# Patient Record
Sex: FEMALE
[De-identification: names, ages, dates, MRNs, and addresses within clinical notes are randomized per-mention and may not be internally consistent; named-entity substitution may affect disease eponyms.]

---

## 2019-11-11 ENCOUNTER — RESULT REVIEW (OUTPATIENT)
Age: 29
End: 2019-11-11

## 2019-11-13 PROBLEM — Z00.00 ENCOUNTER FOR PREVENTIVE HEALTH EXAMINATION: Status: ACTIVE | Noted: 2019-11-13

## 2019-11-21 ENCOUNTER — APPOINTMENT (OUTPATIENT)
Dept: OBGYN | Facility: CLINIC | Age: 29
End: 2019-11-21

## 2021-02-02 ENCOUNTER — TRANSCRIPTION ENCOUNTER (OUTPATIENT)
Age: 31
End: 2021-02-02

## 2021-02-27 ENCOUNTER — TRANSCRIPTION ENCOUNTER (OUTPATIENT)
Age: 31
End: 2021-02-27

## 2021-03-27 ENCOUNTER — TRANSCRIPTION ENCOUNTER (OUTPATIENT)
Age: 31
End: 2021-03-27

## 2021-04-22 ENCOUNTER — TRANSCRIPTION ENCOUNTER (OUTPATIENT)
Age: 31
End: 2021-04-22

## 2024-09-04 ENCOUNTER — NON-APPOINTMENT (OUTPATIENT)
Age: 34
End: 2024-09-04

## 2024-09-04 ENCOUNTER — APPOINTMENT (OUTPATIENT)
Dept: ORTHOPEDIC SURGERY | Facility: CLINIC | Age: 34
End: 2024-09-04
Payer: COMMERCIAL

## 2024-09-04 VITALS
DIASTOLIC BLOOD PRESSURE: 81 MMHG | OXYGEN SATURATION: 99 % | HEART RATE: 76 BPM | SYSTOLIC BLOOD PRESSURE: 114 MMHG | HEIGHT: 65 IN | WEIGHT: 130 LBS | BODY MASS INDEX: 21.66 KG/M2

## 2024-09-04 DIAGNOSIS — M54.2 CERVICALGIA: ICD-10-CM

## 2024-09-04 DIAGNOSIS — Z78.9 OTHER SPECIFIED HEALTH STATUS: ICD-10-CM

## 2024-09-04 DIAGNOSIS — R20.0 ANESTHESIA OF SKIN: ICD-10-CM

## 2024-09-04 DIAGNOSIS — Z60.2 PROBLEMS RELATED TO LIVING ALONE: ICD-10-CM

## 2024-09-04 DIAGNOSIS — G89.29 CERVICALGIA: ICD-10-CM

## 2024-09-04 DIAGNOSIS — R20.2 ANESTHESIA OF SKIN: ICD-10-CM

## 2024-09-04 PROCEDURE — 99204 OFFICE O/P NEW MOD 45 MIN: CPT

## 2024-09-04 RX ORDER — LEVONORGESTREL AND ETHINYL ESTRADIOL 0.1-0.02MG
KIT ORAL
Refills: 0 | Status: ACTIVE | COMMUNITY

## 2024-09-04 RX ORDER — METHYLPREDNISOLONE 4 MG/1
4 TABLET ORAL
Qty: 1 | Refills: 0 | Status: ACTIVE | COMMUNITY
Start: 2024-09-04 | End: 1900-01-01

## 2024-09-04 SDOH — SOCIAL STABILITY - SOCIAL INSECURITY: PROBLEMS RELATED TO LIVING ALONE: Z60.2

## 2024-09-04 NOTE — DISCUSSION/SUMMARY
[de-identified] : Discussed my findings with the patient. I am recommending an MRI cervical spine without contrast, order given. Also given a prescription for a medrol dose pack. Recommending a cervical soft collar to wear as needed in the interim. Follow up with me after imaging has been completed, sooner if there is an issue. All questions answered.

## 2024-09-04 NOTE — HISTORY OF PRESENT ILLNESS
[de-identified] : Ms. MENDES is a very pleasant 34 year old female who complains of left sided neck pain into her trapezius/scapular region since 5/2024. Pain has not improved. Over past 2 months, now having numbness/tingling down the left upper extremity to her left thumb, index and middle fingers. She has stopped exercising due to these symptoms.  The patient no history of previous spine surgery.  The patient has no history of unexpected weight loss, no history of active cancer, no history bladder or bowel dysfunction, no night pain, no fevers or chills.  The past medical history, surgical history, family history, allergies, medications, 10+ point review of systems, family history and social history were reviewed and non contributory.

## 2024-09-04 NOTE — END OF VISIT
[FreeTextEntry3] :   This note was written by Pau Victoria PA-C, acting as a scribe for Dr. Wellington Up. I, Dr. Wellington Up, have read and attest that all the information, medical decision-making, and discharge instructions within are true and accurate.  I, Dr. Wellington Up, personally performed the evaluation and management (E/M) services for this new patient. That E/M includes conducting the initial examination, assessing all conditions, and establishing the plan of care. Today, my ACP, Pau Victoria PA-C, was here to observe my evaluation and management services for this patient to be followed going forward.

## 2024-09-04 NOTE — PHYSICAL EXAM
[de-identified] : Physical Exam:  General: patient is well developed, well nourished, in no acute  distress, alert and oriented x 3.   Mood and affect: normal  Respiratory: no respiratory distress noted  Alignment:The spine is well compensated in the coronal and sagittal plane.   Gait: The patient is able to toe walk and heel walk without difficulty.   Palpation: no tenderness to palpation cervical spine or paraspinal region  Range of motion: Cervical spine ROM is full  Neurologic Exam: Motor: Manual Muscle testing in the upper and lower extremities is 5 out of 5 in all muscle groups. There is no evidence of muscular atrophy in the upper extremities. Sensory: Sensation to light touch is grossly intact in the upper and lower extremities [de-identified] : XR cervical 4 view 9/4/24 (my read): vertebral body and disc space heights well preserved, no dynamic instability, no acute fractures

## 2024-09-18 ENCOUNTER — APPOINTMENT (OUTPATIENT)
Dept: MRI IMAGING | Facility: HOSPITAL | Age: 34
End: 2024-09-18

## 2024-09-18 ENCOUNTER — OUTPATIENT (OUTPATIENT)
Dept: OUTPATIENT SERVICES | Facility: HOSPITAL | Age: 34
LOS: 1 days | End: 2024-09-18
Payer: COMMERCIAL

## 2024-09-18 ENCOUNTER — TRANSCRIPTION ENCOUNTER (OUTPATIENT)
Age: 34
End: 2024-09-18

## 2024-09-18 PROCEDURE — 72141 MRI NECK SPINE W/O DYE: CPT | Mod: 26

## 2024-09-18 PROCEDURE — 72141 MRI NECK SPINE W/O DYE: CPT

## 2024-09-24 ENCOUNTER — APPOINTMENT (OUTPATIENT)
Dept: ORTHOPEDIC SURGERY | Facility: CLINIC | Age: 34
End: 2024-09-24
Payer: COMMERCIAL

## 2024-09-24 DIAGNOSIS — M50.20 OTHER CERVICAL DISC DISPLACEMENT, UNSPECIFIED CERVICAL REGION: ICD-10-CM

## 2024-09-24 DIAGNOSIS — G89.29 CERVICALGIA: ICD-10-CM

## 2024-09-24 DIAGNOSIS — M54.2 CERVICALGIA: ICD-10-CM

## 2024-09-24 PROCEDURE — 99214 OFFICE O/P EST MOD 30 MIN: CPT

## 2024-09-25 PROBLEM — M50.20 CERVICAL DISC HERNIATION: Status: ACTIVE | Noted: 2024-09-25

## 2024-09-25 NOTE — HISTORY OF PRESENT ILLNESS
[de-identified] : Follow up 9/24/24: Patient returns for follow up. She continues to have neck pain into her left trapezius/scapular region. Also with numbness/tingling in her left upper extremity to her left thumb, index, and middle fingers. No improvement in symptoms since last visit. Here to review mri imaging.

## 2024-09-25 NOTE — END OF VISIT
[FreeTextEntry3] :   This note was written by Pau Victoria PA-C, acting as a scribe for Dr. Wellington pU. I, Dr. Wellington Up, have read and attest that all the information, medical decision-making, and discharge instructions within are true and accurate.  I, Dr. Wellington Up, personally performed the evaluation and management (E/M) services for this new patient. That E/M includes conducting the initial examination, assessing all conditions, and establishing the plan of care. Today, my ACP, Pau Victoria PA-C, was here to observe my evaluation and management services for this patient to be followed going forward.

## 2024-09-25 NOTE — DISCUSSION/SUMMARY
[de-identified] : Discussed my findings with the patient. MRI findings are consistent with the symptoms she has been experiencing. Treatment options discussed. I am recommending an evaluation with Dr. Sanford for consideration of [facility], contact information given. She will follow up with me in approximately 3 weeks, sooner if there is an issue. All questions answered.

## 2024-09-25 NOTE — PHYSICAL EXAM
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

## 2024-09-30 ENCOUNTER — APPOINTMENT (OUTPATIENT)
Dept: ORTHOPEDIC SURGERY | Facility: CLINIC | Age: 34
End: 2024-09-30

## 2024-10-09 ENCOUNTER — APPOINTMENT (OUTPATIENT)
Dept: ORTHOPEDIC SURGERY | Facility: CLINIC | Age: 34
End: 2024-10-09
Payer: COMMERCIAL

## 2024-10-09 DIAGNOSIS — R20.2 ANESTHESIA OF SKIN: ICD-10-CM

## 2024-10-09 DIAGNOSIS — R20.0 ANESTHESIA OF SKIN: ICD-10-CM

## 2024-10-09 DIAGNOSIS — M50.20 OTHER CERVICAL DISC DISPLACEMENT, UNSPECIFIED CERVICAL REGION: ICD-10-CM

## 2024-10-09 PROCEDURE — 99214 OFFICE O/P EST MOD 30 MIN: CPT
